# Patient Record
Sex: MALE | Race: ASIAN | NOT HISPANIC OR LATINO | Employment: FULL TIME | URBAN - METROPOLITAN AREA
[De-identification: names, ages, dates, MRNs, and addresses within clinical notes are randomized per-mention and may not be internally consistent; named-entity substitution may affect disease eponyms.]

---

## 2017-02-23 ENCOUNTER — GENERIC CONVERSION - ENCOUNTER (OUTPATIENT)
Dept: OTHER | Facility: OTHER | Age: 42
End: 2017-02-23

## 2018-01-11 NOTE — PROGRESS NOTES
Assessment    1  History of hypercalcemia (V12 29) (Z86 39)   2  Vitamin D insufficiency (268 9) (E55 9)   3  Screening for endocrine/metabolic/immunity disorders (V77 99) (Z13 29,Z13 0,Z13 228)   4  Adhesive capsulitis of left shoulder (726 0) (M75 02)   5  Encounter for preventive health examination (V70 0) (Z00 00)   6  Need for immunization against influenza (V04 81) (Z23)    Plan  Adhesive capsulitis of left shoulder    · *1 - [facility]  Consult Re: Adhesive capsulitis of the left  shoulder after injury about 1 year ago  Please eval and treat 2-3x/week for 6 weeks  Please use any modalities as recommended  Status: Hold For - Scheduling  Requested  for: [id number]  Care Summary provided  : Yes  Need for immunization against influenza    · Fluzone Quadrivalent Intramuscular Suspension  PMH: History of hypercalcemia    · (1) COMPREHENSIVE METABOLIC PANEL; Status:Active; Requested for:80Ytj4701;   PMH: History of hypercalcemia, Screening for hyperlipidemia    · (1) LIPID PANEL, FASTING; Status:Active; Requested for:84Pnm3793;   Screening for endocrine/metabolic/immunity disorders    · (1) TSH WITH FT4 REFLEX; Status:Active; Requested for:51Qxo1730;   Vitamin D insufficiency    · (1) VITAMIN D 25-HYDROXY; Status:Active; Requested for:25Gzd1599;     Discussion/Summary  Impression: health maintenance visit  Currently, he eats a healthy diet  Prostate cancer screening: PSA is not indicated  Testicular cancer screening: testicular cancer screening is not indicated  Colorectal cancer screening: colorectal cancer screening is not indicated  Screening lab work includes glucose, lipid profile and 25-hydroxyvitamin D  The risks and benefits of immunizations were discussed, immunizations are needed and Flu shot given today  Patient discussion: discussed with the patient  Pt is a 36yo male here for regular health maintenance     -Hx of Elevated Ca   Elevated to 10 5 last year but upon recheck (and D/C of excessive Ca supplementation) resolved to 8 6  Recheck CMP now  -Vitamin D Insufficiency  Recheck level now  Instructed pt to begin 2000U daily especially during winter months      -Left Shoulder Adhesive Capsulitis  Due to shoulder injury over 1 year ago with decreased movement since then  Now denies any pain but significant mobility restriction  Instructed pt to begin PT and continue the exercises after the formal 6 weeks  Observe for improvement      -Health Maintenance  Recheck routine blood work with lipid panel, CMP, TSH  No other risk factors or concerning family history - no indication for early colon cancer screening  Depression screening negative  Flu shot given today  Follow up in 1 year for routine physical or as needed for acute visits  Patient is able to Self-Care  Possible side effects of new medications were reviewed with the patient/guardian today  The treatment plan was reviewed with the patient/guardian  The patient/guardian understands and agrees with the treatment plan   The patient was counseled regarding diagnostic results, instructions for management, risk factor reductions, patient and family education, impressions, risks and benefits of treatment options, importance of compliance with treatment  Self Referrals: No      Chief Complaint  Routine annual physical      History of Present Illness  , Adult Male: The patient is being seen for a health maintenance evaluation  The last health maintenance visit was 1 year(s) ago  General Health: The patient's health since the last visit is described as good  He has regular dental visits  He complains of vision problems  He denies hearing loss  Immunizations status: not up to date   Reports he needs to get new glasses  Lifestyle:  He consumes a diverse and healthy diet  He does not have any weight concerns  He exercises regularly  He does not use tobacco  He denies alcohol use  He denies drug use     Reproductive health:  the patient is sexually active  Screening: cancer screening reviewed and current  metabolic screening reviewed and updated  risk screening reviewed and updated  HPI: Pt is a 36yo male here for physical  He reports he is doing well  He reports he injured his left shoulder about a year ago playing Frisbee with his children  He believes he may have "torn something" and had difficulty and pain with movements for awhile  He just waited it out and the pain improved without any problems  Yet, her reports persistent decreased motion in this joint especially restricted with forward flexion and abduction  Denies any persistent pain  Has not done any PT for this but has been doing his normal activities  Does say he has some blurry vision - believes he needs new glasses  Plan to make appointment with eye doctor soon  No other concerns or questions  Review of Systems    Constitutional: no fever, not feeling poorly, no recent weight gain, no chills, not feeling tired and no recent weight loss  Eyes: eyesight problems, but no purulent discharge from the eyes  ENT: no earache and no nasal discharge  Cardiovascular: no chest pain, no palpitations and no extremity edema  Respiratory: no shortness of breath, no cough and no wheezing  Gastrointestinal: no abdominal pain, no nausea, no vomiting, no constipation, no diarrhea and no blood in stools  Genitourinary: no dysuria  Musculoskeletal: arthralgias and myalgias, but as noted in HPI, no limb pain and no limb swelling  Integumentary: no rashes and no itching  Neurological: no headache, no numbness, no tingling, no confusion, no dizziness, no limb weakness, no convulsions, no fainting and no difficulty walking  Psychiatric: not suicidal, no anxiety, no personality change, no sleep disturbances, no depression and no emotional problems  Active Problems    1   Screening for hyperlipidemia (V72 91) (T84 401)    Past Medical History    · History of hypercalcemia (V12 29) (Z86 39)   · History of Patient denies medical problems (V49 89) (Z78 9)    Family History  Mother    · Family history of malignant neoplasm of breast (V16 3) (Z80 3)    Social History    · Never a smoker   · No drug use   · Occasional alcohol use   · Occasional caffeine consumption   · Occupation   · Dentist    Current Meds   1  No Reported Medications Recorded    Allergies    1  Peanut-derived   2  Sesame Oil OIL    3  No Known Environmental Allergies    Vitals   Recorded: [id number] 02:23PM   Temperature 97 7 F, Tympanic   Heart Rate 68   Respiration 14   Systolic 778, LUE, Sitting   Diastolic 60, LUE, Sitting   Height 5 ft 10 in   Weight 163 lb    BMI Calculated 23 39   BSA Calculated 1 91   Pain Scale 0     Physical Exam    Constitutional   General appearance: No acute distress, well appearing and well nourished  Healthy, thin 41yo male in no acute distress  Eyes   Conjunctiva and lids: No swelling, erythema, or discharge  Pupils and irises: Equal, round and reactive to light  Pulmonary   Respiratory effort: No increased work of breathing or signs of respiratory distress  Auscultation of lungs: Clear to auscultation, equal breath sounds bilaterally, no wheezes, no rales, no rhonci  Cardiovascular   Auscultation of heart: Normal rate and rhythm, normal S1 and S2, without murmurs  Examination of extremities for edema and/or varicosities: Normal     Abdomen   Abdomen: Non-tender, no masses  Liver and spleen: No hepatomegaly or splenomegaly  Lymphatic   Palpation of lymph nodes in neck: No lymphadenopathy  Musculoskeletal   Gait and station: Normal     Skin   Skin and subcutaneous tissue: Normal without rashes or lesions  Neurologic   Cranial nerves: Cranial nerves 2-12 intact  Sensation: No sensory loss      Psychiatric   Orientation to person, place and time: Normal     Mood and affect: Normal     Additional Exam:  Left Shoulder: restriction of passive and active range of motion with forward flexion and abduction; no tenderness to palpation  Results/Data  PHQ-9 Adult Depression Screening [id number] 02:59PM Teri Kurtz     Test Name Result Flag Reference   PHQ-9 Adult Depression Score 0     Over the last two weeks, how often have you been bothered by any of the following problems? Little interest or pleasure in doing things: Not at all - 0  Feeling down, depressed, or hopeless: Not at all - 0  Trouble falling or staying asleep, or sleeping too much: Not at all - 0  Feeling tired or having little energy: Not at all - 0  Poor appetite or over eating: Not at all - 0  Feeling bad about yourself - or that you are a failure or have let yourself or your family down: Not at all - 0  Trouble concentrating on things, such as reading the newspaper or watching television: Not at all - 0  Moving or speaking so slowly that other people could have noticed  Or the opposite -  being so fidgety or restless that you have been moving around a lot more than usual: Not at all - 0  Thoughts that you would be better off dead, or of hurting yourself in some way: Not at all - 0   PHQ-9 Adult Depression Screening Negative     PHQ-9 Difficulty Level Not difficult at all     PHQ-9 Severity No Depression         Attending Note  Attending Note: Attending Note: I discussed the case with the Resident and reviewed the Resident's note  Level of Participation: I was present in clinic, but did not examine the patient  I agree with the Resident's note  I agree with the Resident's note except monitor calcium and Vit D level        Signatures   Electronically signed by : Carolin Gracia DO; Dec 20 2016  2:59PM EST                       (Author)    Electronically signed by : HAKAN Sierra ; Dec 23 2016  8:56PM EST                       (Review)

## 2018-01-11 NOTE — MISCELLANEOUS
02/23/2017        PATIENT: Yudy Perez   POLICY # [id number]  CASE NUMBER [id number]      ATTN: Cuco White AS A NEW  PATIENT ON 12/20/2016  MR  [id number] Alicja Aleman  HIS INSURANCE CARD WHICH WAS [employer] HOWEVER THE  DID NOT  SEE THAT THE INSURANCE CARD HAD FIVE [facility] AT THE TOP OF THE CARD AND THAT THEY WERE CAPITATED TO [facility], ALSO THERE WAS NO PCP SELECTION REQUIRED  SO THE PATIENT WAS SEEN AND NOW HAS RECEIVED A BILL FOR THE VISIT  WE ARE REQUESTING AN AUTH SO THAT THIS ONE VISIT IS COVERED        Electronically signed by:Dipika  [facility]   Feb 23 2017  3:57PM EST Author

## 2018-07-27 ENCOUNTER — TRANSCRIBE ORDERS (OUTPATIENT)
Dept: LAB | Facility: HOSPITAL | Age: 43
End: 2018-07-27

## 2018-07-27 ENCOUNTER — LAB (OUTPATIENT)
Dept: LAB | Facility: HOSPITAL | Age: 43
End: 2018-07-27
Payer: COMMERCIAL

## 2018-07-27 DIAGNOSIS — R89.9 ABNORMAL PLEURAL FLUID: ICD-10-CM

## 2018-07-27 DIAGNOSIS — E78.00 PURE HYPERCHOLESTEROLEMIA: ICD-10-CM

## 2018-07-27 DIAGNOSIS — E55.9 AVITAMINOSIS D: ICD-10-CM

## 2018-07-27 DIAGNOSIS — E55.9 AVITAMINOSIS D: Primary | ICD-10-CM

## 2018-07-27 LAB
25(OH)D3 SERPL-MCNC: 26.6 NG/ML (ref 30–100)
ALBUMIN SERPL BCP-MCNC: 3.4 G/DL (ref 3.5–5)
ALP SERPL-CCNC: 64 U/L (ref 46–116)
ALT SERPL W P-5'-P-CCNC: 32 U/L (ref 12–78)
ANION GAP SERPL CALCULATED.3IONS-SCNC: 5 MMOL/L (ref 4–13)
AST SERPL W P-5'-P-CCNC: 22 U/L (ref 5–45)
BILIRUB SERPL-MCNC: 0.53 MG/DL (ref 0.2–1)
BUN SERPL-MCNC: 15 MG/DL (ref 5–25)
CALCIUM SERPL-MCNC: 9.4 MG/DL (ref 8.3–10.1)
CHLORIDE SERPL-SCNC: 107 MMOL/L (ref 100–108)
CHOLEST SERPL-MCNC: 202 MG/DL (ref 50–200)
CO2 SERPL-SCNC: 26 MMOL/L (ref 21–32)
CREAT SERPL-MCNC: 0.83 MG/DL (ref 0.6–1.3)
GFR SERPL CREATININE-BSD FRML MDRD: 108 ML/MIN/1.73SQ M
GLUCOSE P FAST SERPL-MCNC: 92 MG/DL (ref 65–99)
HDLC SERPL-MCNC: 59 MG/DL (ref 40–60)
LDLC SERPL CALC-MCNC: 132 MG/DL (ref 0–100)
NONHDLC SERPL-MCNC: 143 MG/DL
POTASSIUM SERPL-SCNC: 4 MMOL/L (ref 3.5–5.3)
PROT SERPL-MCNC: 7.7 G/DL (ref 6.4–8.2)
SODIUM SERPL-SCNC: 138 MMOL/L (ref 136–145)
TRIGL SERPL-MCNC: 56 MG/DL

## 2018-07-27 PROCEDURE — 82306 VITAMIN D 25 HYDROXY: CPT

## 2018-07-27 PROCEDURE — 36415 COLL VENOUS BLD VENIPUNCTURE: CPT

## 2018-07-27 PROCEDURE — 80061 LIPID PANEL: CPT

## 2018-07-27 PROCEDURE — 80053 COMPREHEN METABOLIC PANEL: CPT

## 2020-12-29 ENCOUNTER — IMMUNIZATIONS (OUTPATIENT)
Dept: FAMILY MEDICINE CLINIC | Facility: HOSPITAL | Age: 45
End: 2020-12-29
Payer: COMMERCIAL

## 2020-12-29 DIAGNOSIS — Z23 ENCOUNTER FOR IMMUNIZATION: ICD-10-CM

## 2020-12-29 PROCEDURE — 0011A SARS-COV-2 / COVID-19 MRNA VACCINE (MODERNA) 100 MCG: CPT

## 2020-12-29 PROCEDURE — 91301 SARS-COV-2 / COVID-19 MRNA VACCINE (MODERNA) 100 MCG: CPT

## 2021-01-24 ENCOUNTER — IMMUNIZATIONS (OUTPATIENT)
Dept: FAMILY MEDICINE CLINIC | Facility: HOSPITAL | Age: 46
End: 2021-01-24

## 2021-01-24 DIAGNOSIS — Z23 ENCOUNTER FOR IMMUNIZATION: Primary | ICD-10-CM

## 2021-01-24 PROCEDURE — 0012A SARS-COV-2 / COVID-19 MRNA VACCINE (MODERNA) 100 MCG: CPT

## 2021-01-24 PROCEDURE — 91301 SARS-COV-2 / COVID-19 MRNA VACCINE (MODERNA) 100 MCG: CPT
